# Patient Record
Sex: MALE | Race: WHITE | Employment: UNEMPLOYED | URBAN - METROPOLITAN AREA
[De-identification: names, ages, dates, MRNs, and addresses within clinical notes are randomized per-mention and may not be internally consistent; named-entity substitution may affect disease eponyms.]

---

## 2022-01-08 ENCOUNTER — HOSPITAL ENCOUNTER (EMERGENCY)
Age: 22
Discharge: HOME | End: 2022-01-08
Attending: PEDIATRICS
Payer: COMMERCIAL

## 2022-01-08 VITALS
DIASTOLIC BLOOD PRESSURE: 84 MMHG | SYSTOLIC BLOOD PRESSURE: 135 MMHG | RESPIRATION RATE: 16 BRPM | HEART RATE: 65 BPM | WEIGHT: 278.22 LBS | OXYGEN SATURATION: 98 % | TEMPERATURE: 97.5 F

## 2022-01-08 DIAGNOSIS — R51.9 ACUTE INTRACTABLE HEADACHE, UNSPECIFIED HEADACHE TYPE: Primary | ICD-10-CM

## 2022-01-08 DIAGNOSIS — Z20.822 SUSPECTED COVID-19 VIRUS INFECTION: ICD-10-CM

## 2022-01-08 LAB — SARS-COV-2, COV2: NORMAL

## 2022-01-08 PROCEDURE — U0005 INFEC AGEN DETEC AMPLI PROBE: HCPCS

## 2022-01-08 PROCEDURE — 96375 TX/PRO/DX INJ NEW DRUG ADDON: CPT

## 2022-01-08 PROCEDURE — 74011000250 HC RX REV CODE- 250: Performed by: PEDIATRICS

## 2022-01-08 PROCEDURE — 96361 HYDRATE IV INFUSION ADD-ON: CPT

## 2022-01-08 PROCEDURE — 96374 THER/PROPH/DIAG INJ IV PUSH: CPT

## 2022-01-08 PROCEDURE — 74011250636 HC RX REV CODE- 250/636: Performed by: PEDIATRICS

## 2022-01-08 PROCEDURE — 99283 EMERGENCY DEPT VISIT LOW MDM: CPT

## 2022-01-08 RX ORDER — FLUOXETINE HYDROCHLORIDE 20 MG/1
20 CAPSULE ORAL DAILY
COMMUNITY
End: 2022-06-14

## 2022-01-08 RX ORDER — DIPHENHYDRAMINE HYDROCHLORIDE 50 MG/ML
25 INJECTION, SOLUTION INTRAMUSCULAR; INTRAVENOUS
Status: COMPLETED | OUTPATIENT
Start: 2022-01-08 | End: 2022-01-08

## 2022-01-08 RX ORDER — KETOROLAC TROMETHAMINE 30 MG/ML
15 INJECTION, SOLUTION INTRAMUSCULAR; INTRAVENOUS
Status: COMPLETED | OUTPATIENT
Start: 2022-01-08 | End: 2022-01-08

## 2022-01-08 RX ORDER — RIZATRIPTAN BENZOATE 10 MG/1
10 TABLET, ORALLY DISINTEGRATING ORAL
COMMUNITY

## 2022-01-08 RX ADMIN — DIPHENHYDRAMINE HYDROCHLORIDE 25 MG: 50 INJECTION INTRAMUSCULAR; INTRAVENOUS at 03:12

## 2022-01-08 RX ADMIN — KETOROLAC TROMETHAMINE 15 MG: 30 INJECTION, SOLUTION INTRAMUSCULAR; INTRAVENOUS at 03:11

## 2022-01-08 RX ADMIN — SODIUM CHLORIDE 1000 ML: 9 INJECTION, SOLUTION INTRAVENOUS at 03:11

## 2022-01-08 RX ADMIN — SODIUM CHLORIDE, PRESERVATIVE FREE 10 MG: 5 INJECTION INTRAVENOUS at 03:12

## 2022-01-08 NOTE — ED NOTES
Patient education given on compazine, toradol, and benadryl PIV administration, the desired effects, and possible side effects and the patient expresses understanding and acceptance of instructions.  Madhav Zaragoza RN 1/8/2022 3:20 AM

## 2022-01-08 NOTE — ED PROVIDER NOTES
The history is provided by the patient. Headache   This is a recurrent (hx of migrianes for 2.5 years. Scheduled for MRI in 5 days. HA usually posterior. Now all over and home meds not helping. used prozac and tripatan prescribed by Neuro) problem. The problem occurs constantly. The problem has been gradually worsening. Associated with: Worse HA and took home covid test 2 days ago and positive. The pain is located in the generalized region. The quality of the pain is described as throbbing. The pain is severe. Pertinent negatives include no anorexia, no fever, no malaise/fatigue, no chest pressure, no near-syncope, no palpitations, no syncope, no shortness of breath, no weakness, no dizziness, no visual change, no nausea and no vomiting. IMM UTD    Past Medical History:   Diagnosis Date    Epididymitis        Past Surgical History:   Procedure Laterality Date    HX ORTHOPAEDIC      HX TYMPANOSTOMY           History reviewed. No pertinent family history. Social History     Socioeconomic History    Marital status: SINGLE     Spouse name: Not on file    Number of children: Not on file    Years of education: Not on file    Highest education level: Not on file   Occupational History    Not on file   Tobacco Use    Smoking status: Never Smoker    Smokeless tobacco: Never Used   Substance and Sexual Activity    Alcohol use: Yes    Drug use: Not Currently    Sexual activity: Not Currently   Other Topics Concern    Not on file   Social History Narrative    Not on file     Social Determinants of Health     Financial Resource Strain:     Difficulty of Paying Living Expenses: Not on file   Food Insecurity:     Worried About Running Out of Food in the Last Year: Not on file    Yojana of Food in the Last Year: Not on file   Transportation Needs:     Lack of Transportation (Medical): Not on file    Lack of Transportation (Non-Medical):  Not on file   Physical Activity:     Days of Exercise per Week: Not on file    Minutes of Exercise per Session: Not on file   Stress:     Feeling of Stress : Not on file   Social Connections:     Frequency of Communication with Friends and Family: Not on file    Frequency of Social Gatherings with Friends and Family: Not on file    Attends Oriental orthodox Services: Not on file    Active Member of Clubs or Organizations: Not on file    Attends Club or Organization Meetings: Not on file    Marital Status: Not on file   Intimate Partner Violence:     Fear of Current or Ex-Partner: Not on file    Emotionally Abused: Not on file    Physically Abused: Not on file    Sexually Abused: Not on file   Housing Stability:     Unable to Pay for Housing in the Last Year: Not on file    Number of [place] in the Last Year: Not on file    Unstable Housing in the Last Year: Not on file         ALLERGIES: Patient has no known allergies. Review of Systems   Constitutional: Negative for fever and malaise/fatigue. Respiratory: Negative for shortness of breath. Cardiovascular: Negative for palpitations, syncope and near-syncope. Gastrointestinal: Negative for anorexia, nausea and vomiting. Neurological: Positive for headaches. Negative for dizziness and weakness. ROS limited by age      Vitals:    01/08/22 0214 01/08/22 0220   BP:  135/84   Pulse:  65   Resp:  16   Temp:  97.5 °F (36.4 °C)   SpO2:  98%   Weight: 126.2 kg (278 lb 3.5 oz)             Physical Exam   Physical Exam   Constitutional: Appears well-developed and well-nourished. active. No distress. HENT:   Head: NCAT  Ears: Right Ear: Tympanic membrane normal. Left Ear: Tympanic membrane normal.   Nose: Nose normal. No nasal discharge. Mouth/Throat: Mucous membranes are moist. Pharynx is normal.   Eyes: Conjunctivae are normal. Right eye exhibits no discharge. Left eye exhibits no discharge. PERRL, EOMI. photophobia  Neck: Normal range of motion. Neck supple.    Cardiovascular: Normal rate, regular rhythm, S1 normal and S2 normal. No murmur   2+ distal pulses   Pulmonary/Chest: Effort normal and breath sounds normal. No nasal flaring or stridor. No respiratory distress. no wheezes. no rhonchi. no rales. no retraction. Abdominal: Soft. . No tenderness. no guarding. No hernia. No masses or HSM  Musculoskeletal: Normal range of motion. no edema, no tenderness, no deformity and no signs of injury. Lymphadenopathy:   no cervical adenopathy. Neurological:  alert. normal strength. normal muscle tone. No focal defecits  Skin: Skin is warm and dry. Capillary refill takes less than 3 seconds. Turgor is normal. No petechiae, no purpura and no rash noted. No cyanosis. MDM     Patient evaluated after IVF, benadryl, compazine and toradol. Patient is awake, alert, with normal neurological exam and improving symptoms. Given patient's age, history of headache, physical exam findings, and improvement of symptoms during the observation period, there is no need for neuroimaging at this time. Home covid positive. Sending PCR to confirm at patient request.  Headache is likely secondary to migraine headache exacerbated by current illness. Will discharge the patient home with supportive care and follow-up with PCP in 1-2 days, and with pediatric neurology. Patient and caregiver were instructed to return with worsening pain, persistent vomiting, change in vision, change in personality, weakness, numbness, or other concerning symptoms. ICD-10-CM ICD-9-CM   1. Acute intractable headache, unspecified headache type  R51.9 784.0   2.  Suspected COVID-19 virus infection  Z20.822 V01.79       Current Discharge Medication List          Follow-up Information     Follow up With Specialties Details Why Contact Info    Reg Ashby MD Pediatric Neurology Call today  [address] [place] [id number] [phone] [facility] DEPT Pediatric Emergency Medicine  As needed, If symptoms worsen Kvng Garza Colleen Frances M.D.     Procedures

## 2022-01-08 NOTE — ED TRIAGE NOTES
Triage: patient tested positive for covid this past Thursday with an at home test. Today with worsening headache. Patient followed by neurlogy for headaches. Tried his rescue med around 1230am without relief. +Nausea, no vomiting. No known fevers, no diarrhea.

## 2022-01-08 NOTE — ED NOTES
Pt discharged home with parent/guardian. Pt acting age appropriately, respirations regular and unlabored, cap refill less than two seconds. Skin pink, dry and warm. Lungs clear bilaterally. No further complaints at this time. Parent/guardian verbalized understanding of discharge paperwork and has no further questions at this time. Education provided about continuation of care, follow up care and medication administration: follow-up with your Neurologist and your My Chart for your pending covid results. Parent/guardian able to provided teach back about discharge instructions.

## 2022-01-09 LAB
SARS-COV-2, XPLCVT: DETECTED
SOURCE, COVRS: ABNORMAL

## 2022-04-21 ENCOUNTER — OFFICE VISIT (OUTPATIENT)
Dept: ORTHOPEDIC SURGERY | Age: 22
End: 2022-04-21
Payer: COMMERCIAL

## 2022-04-21 VITALS — HEIGHT: 75 IN | WEIGHT: 265 LBS | BODY MASS INDEX: 32.95 KG/M2

## 2022-04-21 DIAGNOSIS — S93.401A SEVERE SPRAIN OF RIGHT ANKLE, INITIAL ENCOUNTER: Primary | ICD-10-CM

## 2022-04-21 DIAGNOSIS — S99.911A INJURY OF RIGHT ANKLE, INITIAL ENCOUNTER: ICD-10-CM

## 2022-04-21 PROCEDURE — L4387 NON-PNEUM WALK BOOT PRE OTS: HCPCS | Performed by: ORTHOPAEDIC SURGERY

## 2022-04-21 PROCEDURE — 99202 OFFICE O/P NEW SF 15 MIN: CPT | Performed by: ORTHOPAEDIC SURGERY

## 2022-04-21 NOTE — LETTER
4/27/2022    Patient: Felicia Hernandez   YOB: 2000   Date of Visit: 4/21/2022     Antonio Mckinney MD  [address] [facility] [address]  Via Fax: [phone]    Dear Antonio Mckinney MD,      Thank you for referring Mr. Donn Ernandez to [facility] for evaluation. My notes for this consultation are attached. If you have questions, please do not hesitate to call me. I look forward to following your patient along with you.       Sincerely,    Syed Araujo MD

## 2022-04-21 NOTE — PROGRESS NOTES
Yaakov Frey (: 2000) is a 25 y.o. male, patient,here for evaluation of the following   Chief Complaint   Patient presents with    Ankle Injury     right ankle injury /rolled his ankle on  when he was playing basketball        ASSESSMENT/PLAN:  Below is the assessment and plan developed based on review of pertinent history, physical exam, labs, studies, and medications. 1. Severe sprain of right ankle, initial encounter  -     REFERRAL TO DME  -     WA NON-PNEUM WALK BOOT PRE OTS  2. Injury of right ankle, initial encounter  -     XR ANKLE RT MIN 3 V; Future  -     REFERRAL TO DME  -     WA NON-PNEUM WALK BOOT PRE OTS    Patient has a severe ankle sprain, recommend immobilization in a tall boot, will limit activities until symptoms improve. Recommend limited weightbearing for the next 2 to 3 weeks until symptoms improve and after that time can start weightbearing as tolerated in the boot. I did inform patient that hairline fractures can be difficult to see and if he does have a hairline fracture we should be able to see something next visit. However no surgery is indicated as the overall alignment of the ankle is normal.  Next follow-up will get right ankle 3 views nonweightbearing x-rays      Return in about 4 weeks (around 2022) for repeat xrays. No Known Allergies    Current Outpatient Medications   Medication Sig    FLUoxetine (PROzac) 20 mg capsule Take 20 mg by mouth daily.  rizatriptan (MAXALT-MLT) 10 mg disintegrating tablet Take 10 mg by mouth once as needed for Migraine. No current facility-administered medications for this visit. Past Medical History:   Diagnosis Date    Epididymitis        Past Surgical History:   Procedure Laterality Date    HX ORTHOPAEDIC      HX TYMPANOSTOMY         History reviewed. No pertinent family history.     Social History     Socioeconomic History    Marital status: SINGLE     Spouse name: Not on file    Number of children: Not on file    Years of education: Not on file    Highest education level: Not on file   Occupational History    Not on file   Tobacco Use    Smoking status: Never Smoker    Smokeless tobacco: Never Used   Substance and Sexual Activity    Alcohol use: Yes    Drug use: Not Currently    Sexual activity: Not Currently   Other Topics Concern    Not on file   Social History Narrative    Not on file     Social Determinants of Health     Financial Resource Strain:     Difficulty of Paying Living Expenses: Not on file   Food Insecurity:     Worried About Running Out of Food in the Last Year: Not on file    Yojana of Food in the Last Year: Not on file   Transportation Needs:     Lack of Transportation (Medical): Not on file    Lack of Transportation (Non-Medical): Not on file   Physical Activity:     Days of Exercise per Week: Not on file    Minutes of Exercise per Session: Not on file   Stress:     Feeling of Stress : Not on file   Social Connections:     Frequency of Communication with Friends and Family: Not on file    Frequency of Social Gatherings with Friends and Family: Not on file    Attends Hindu Services: Not on file    Active Member of [address] [place] [address] or Organizations: Not on file    Attends Club or Organization Meetings: Not on file    Marital Status: Not on file   Intimate Partner Violence:     Fear of Current or Ex-Partner: Not on file    Emotionally Abused: Not on file    Physically Abused: Not on file    Sexually Abused: Not on file   Housing Stability:     Unable to Pay for Housing in the Last Year: Not on file    Number of [place] in the Last Year: Not on file    Unstable Housing in the Last Year: Not on file           Vitals:  Ht 6' 3\" (1.905 m)   Wt 265 lb (120.2 kg)   BMI 33.12 kg/m²    Body mass index is 33.12 kg/m².             SUBJECTIVE/OBJECTIVE:  Lamont Martinez (: 2000)   New patient presents today with complaint of right ankle pain related to an injury sustained on April 18, 2022 while playing basketball he turned his ankle and fell onto a twisted ankle. Since that time his pain has been severe stabbing throbbing pain that is constant and associated with swelling and bruising. Standing, bending, twisting, lifting and walking make it worse. Symptoms getting worse not better, patient has tried ice and Excedrin extra strength. Physical Exam  Pleasant well-nourished male , alert and oriented to person, time and place, no acute distress. Using crutches, nonweightbearing gait, limited weightbearing stance. Right lower extremity/ankle: Calf soft nontender, ligaments grossly stable although exam was not thorough because of pain and swelling to the ankle. There is tenderness diffusely around the medial, lateral and anterior ankle, the Achilles tendon is intact with negative Hampton test.  The fibula and medial malleolus is minimally tender, negative ankle squeeze test.    Right foot: There is swelling to the foot and diffuse tenderness around the midfoot joints, base of fifth metatarsal, navicular, cuboid, forefoot metatarsals are nontender. Full active and passive range of motion of toes is noted with normal strength 5/5. Contralateral foot and ankle exam, nontender, no swelling ligaments grossly stable. Normal weightbearing stance. Neurovascular exam intact for light touch sensation, cap refill, dorsalis pedis pulse palpable, flexion/extension strength 5/5. Skin intact without open wounds, lesions or ulcers, no skin discolorations, normal warmth to skin. Imaging:    XR Results (most recent):  Results from Appointment encounter on 04/21/22    XR ANKLE RT MIN 3 V    Narrative  Right ankle AP, lateral and oblique x-rays show no obvious fractures or dislocations, there is a normal ankle mortise and medial clear space and syndesmosis are intact. There is soft tissue swelling lateral medial ankle.   Hindfoot joints appear normal and intact on 3 views. An electronic signature was used to authenticate this note.   -- Piter Harper MD

## 2022-06-14 ENCOUNTER — OFFICE VISIT (OUTPATIENT)
Dept: NEUROLOGY | Age: 22
End: 2022-06-14
Payer: COMMERCIAL

## 2022-06-14 ENCOUNTER — TELEPHONE (OUTPATIENT)
Dept: NEUROLOGY | Age: 22
End: 2022-06-14

## 2022-06-14 VITALS — SYSTOLIC BLOOD PRESSURE: 134 MMHG | DIASTOLIC BLOOD PRESSURE: 82 MMHG | BODY MASS INDEX: 33.12 KG/M2 | WEIGHT: 265 LBS

## 2022-06-14 DIAGNOSIS — G43.711 INTRACTABLE CHRONIC MIGRAINE WITHOUT AURA AND WITH STATUS MIGRAINOSUS: Primary | ICD-10-CM

## 2022-06-14 PROCEDURE — 99204 OFFICE O/P NEW MOD 45 MIN: CPT | Performed by: PSYCHIATRY & NEUROLOGY

## 2022-06-14 RX ORDER — ERENUMAB-AOOE 140 MG/ML
140 INJECTION, SOLUTION SUBCUTANEOUS
Qty: 1 EACH | Refills: 4 | Status: SHIPPED | OUTPATIENT
Start: 2022-06-14

## 2022-06-14 NOTE — TELEPHONE ENCOUNTER
Aimovig PA initiated through Cover my meds key # [facility]    Message from Plan  PA Case: [id number], Status: Approved, Coverage Starts on: 6/14/2022 12:00:00 AM, Coverage Ends on: 9/12/2022 12:00:00 AM

## 2022-06-14 NOTE — PROGRESS NOTES
[facility] and [address] at [facility] Neurology Clinics at [facility] [address], [address] [facility] [place] [address] [facility] [address]  [phone] Office  [phone] Facsimile           Referring: Zoila Keller MD      Chief Complaint   Patient presents with    New Patient    Headache     constant pain at base of head over 2.5 yrs, feels like someone twisting knuckle at back of head. migraines approx 2/week      45-year-old gentleman presents today accompanied by his mother for evaluation of ongoing and worsening headaches. He has history of concussion x2 perhaps 3 with soccer and football over the years. He has had for the last 2-1/2 to 3 years continuous pain in the posterior region feels like a stabbing pain with associated photophobia phonophobia and nausea. He also gets lightheaded. Headache is daily. It varies in intensity tends to be worse midday and evening. No focal deficits with it. He goes to bed at 10 PM falls asleep quickly and awakens at 8. He does not snore. Does not awaken gasping for breath. He has been seeing Dr. Pradeep Murillo of pediatric neurology and he is tried multiple medications including Trokendi, Pamelor, Emgality, SPG blocks, Prozac and occipital nerve blocks and nothing has relieved his discomfort. He is tried Zomig as well as Maxalt and said as well and none of those were effective. Mother indicates both sides of the family have history of migraine. Record review finds an emergency department visit January 8, 2022 where patient noted history of migraine for 2-1/2 years and he was scheduled to have an MRI 5 days from the time of that visit. Noted posterior headache usually but now he had an allover headache and the meds were not helping. He was apparently prescribed Prozac and triptan by neurology.   He describes severe throbbing pain holoacranial with no associated symptom. His examination was unremarkable. He was treated with IV fluid, Benadryl, Compazine, and Toradol. He was discharged home to follow-up with his primary physician and pediatric neurology. I see no imaging in our system    [facility] September 3, 2020 where patient was noted to have chronic migraine with aura. He had an SPG block and he tolerated that well. He had complete relief before leaving the office. Repeating that as needed was planned. They discussed Trokendi and Topamax and other options. They discussed different triptans and he wanted to start Zomig 5 mg ENT. They discussed risk of returning to football and dangers of subthreshold impacts. Further review of previous notes finds that he also tried rizatriptan 10 mg MLT. He was given Pamelor 25 mg nightly. He was given Nurtec 75 mg as needed. He was given Trokendi. In July 2021 he was started on [facility] and in December 2021 given Prozac. MRI of the brain January 12, 2022 at [facility] read out as a focus of hyperintensity in the periventricular white matter of the right posterior frontal lobe nonspecific. Note from [facility] in [place] where the patient had dizziness and imbalance on foam with eyes closed and he was to undergo vestibular therapy. Past Medical History:   Diagnosis Date    Concussion     Epididymitis     Headache September 2019    Migraine September 2019       Past Surgical History:   Procedure Laterality Date    HX ORTHOPAEDIC      HX TYMPANOSTOMY         Current Outpatient Medications   Medication Sig Dispense Refill    rizatriptan (MAXALT-MLT) 10 mg disintegrating tablet Take 10 mg by mouth once as needed for Migraine.           Allergies   Allergen Reactions    Cephalosporins Rash       Social History     Tobacco Use    Smoking status: Never Smoker    Smokeless tobacco: Never Used   Vaping Use    Vaping Use: Never used   Substance Use Topics    Alcohol use: Not Currently     Alcohol/week: 0.0 standard drinks    Drug use: Not Currently       Family History   Problem Relation Age of Onset    No Known Problems Mother     No Known Problems Father        Review of Systems  Pertinent positives and negatives as noted. Examination  Visit Vitals  /82 (BP 1 Location: Right arm, BP Patient Position: Sitting, BP Cuff Size: Large adult)   Wt 120.2 kg (265 lb)   BMI 33.12 kg/m²   Neurologically, he is awake, alert, oriented with normal speech, language, and cognition. Cranial nerves intact 2-12. He has normal bulk and tone. There is no abnormal movement. There is no pronation or drift. He is able to generate full strength in the upper and lower extremities and all muscle groups to direct confrontational testing. Reflexes are symmetrical in the upper and lower extremities bilaterally. No pathologic reflexes are elicited. He has no ataxia or past pointing. Impression/Plan  21-year-old gentleman with longstanding chronic migraine refractory to multiple medications as noted above  He does have history of concussions as stated above and we discussed that  We discussed options for him including Botox and Aimovig as well as Ajovy  I think Waneta Grapes would be a better target and that Emgality was not effective in blocking the ligand so we will block receptor  Discussed potential side effects including constipation  Follow-up in 3 months    Moiz Hardin MD          This note was created using voice recognition software. Despite editing, there may be syntax errors.

## 2022-07-25 ENCOUNTER — TRANSCRIBE ORDER (OUTPATIENT)
Dept: REGISTRATION | Age: 22
End: 2022-07-25

## 2022-07-25 ENCOUNTER — HOSPITAL ENCOUNTER (OUTPATIENT)
Dept: GENERAL RADIOLOGY | Age: 22
Discharge: HOME | End: 2022-07-25
Payer: COMMERCIAL

## 2022-07-25 DIAGNOSIS — M54.2 NECK PAIN: ICD-10-CM

## 2022-07-25 DIAGNOSIS — M54.2 NECK PAIN: Primary | ICD-10-CM

## 2022-07-25 PROCEDURE — 72050 X-RAY EXAM NECK SPINE 4/5VWS: CPT

## 2022-08-03 ENCOUNTER — TRANSCRIBE ORDER (OUTPATIENT)
Dept: SCHEDULING | Age: 22
End: 2022-08-03

## 2022-08-03 DIAGNOSIS — R51.9 HEADACHE: ICD-10-CM

## 2022-08-03 DIAGNOSIS — S06.9XAA TBI (TRAUMATIC BRAIN INJURY): Primary | ICD-10-CM

## 2022-08-09 ENCOUNTER — HOSPITAL ENCOUNTER (OUTPATIENT)
Dept: MRI IMAGING | Age: 22
Discharge: HOME | End: 2022-08-09
Attending: FAMILY MEDICINE
Payer: COMMERCIAL

## 2022-08-09 DIAGNOSIS — S06.9XAA TBI (TRAUMATIC BRAIN INJURY): ICD-10-CM

## 2022-08-09 DIAGNOSIS — R51.9 HEADACHE: ICD-10-CM

## 2022-08-09 PROCEDURE — 70551 MRI BRAIN STEM W/O DYE: CPT

## 2023-05-24 RX ORDER — ERENUMAB-AOOE 140 MG/ML
140 INJECTION, SOLUTION SUBCUTANEOUS
COMMUNITY
Start: 2022-06-14

## 2023-05-24 RX ORDER — RIZATRIPTAN BENZOATE 10 MG/1
10 TABLET, ORALLY DISINTEGRATING ORAL
COMMUNITY

## 2023-06-28 ENCOUNTER — TELEPHONE (OUTPATIENT)
Age: 23
End: 2023-06-28

## 2023-10-02 ENCOUNTER — OFFICE VISIT (OUTPATIENT)
Age: 23
End: 2023-10-02
Payer: COMMERCIAL

## 2023-10-02 VITALS
BODY MASS INDEX: 28.23 KG/M2 | OXYGEN SATURATION: 99 % | HEIGHT: 74 IN | SYSTOLIC BLOOD PRESSURE: 122 MMHG | HEART RATE: 61 BPM | WEIGHT: 220 LBS | DIASTOLIC BLOOD PRESSURE: 84 MMHG

## 2023-10-02 DIAGNOSIS — G43.711 CHRONIC MIGRAINE WITHOUT AURA, INTRACTABLE, WITH STATUS MIGRAINOSUS: Primary | ICD-10-CM

## 2023-10-02 PROCEDURE — 99214 OFFICE O/P EST MOD 30 MIN: CPT | Performed by: NURSE PRACTITIONER

## 2023-10-02 RX ORDER — UBROGEPANT 100 MG/1
TABLET ORAL
Qty: 16 TABLET | Refills: 5 | Status: SHIPPED | OUTPATIENT
Start: 2023-10-02

## 2023-10-02 RX ORDER — FREMANEZUMAB-VFRM 225 MG/1.5ML
INJECTION SUBCUTANEOUS
COMMUNITY
Start: 2023-07-22

## 2023-10-02 NOTE — PROGRESS NOTES
MHA, looking Into Botox  On ajovy has 1 MHA weekly, lasts 24-36 hours  Has consistent HA 24/7  Triptan does help bring down to baseline  Has been on quilipta, ajovy, topamax  Triptans  Said W/O prevention will have 15+ MHA

## 2023-10-03 NOTE — PROGRESS NOTES
Valente Diaz is a 23 y.o. male who presents with the following  Chief Complaint   Patient presents with    Follow-up       HPI    Patient with chronic migraine comes in for follow-up  He is accompanied with his mother  He is currently at [address]  He has had an MRI last year  We discussed this and discussed getting in with an endocrine  He has been tested and other types of ways to look at causes of his migraines  He does see Dr. Eros Ryan at the brain injury rehab and will be seeing him again soon  He has had blood work    He is currently on [address] and has about 20 migraine days a month  He has headaches on the other days  His migraines are usually unilateral  He has hypersensitivity  He uses triptans but they just bring it down to a tolerable level where he can function  His migraines can last anywhere from 24 to 36 hours each  He has been on Qulipta, Topamax  He cannot take blood pressure medications  He is currently on Ajovy but would come off of this for Botox    He has been on nortriptyline with no results      Allergies   Allergen Reactions    Cephalosporins Rash       Current Outpatient Medications   Medication Sig Dispense Refill    Onabotulinumtoxin A (BOTOX) 200 units injection Inject 155 units into 31 FDA approved sites in head, face, neck, every 3 months for chronic migraine. 1 each 3    Ubrogepant (UBRELVY) 100 MG TABS 1 at HA onset and repeat in 2 hours if needed. Max 2 in 24 hours 16 tablet 5    rizatriptan (MAXALT-MLT) 10 MG disintegrating tablet Take 1 tablet by mouth once as needed      AJOVY 225 MG/1.5ML SOAJ        No current facility-administered medications for this visit.         Social History     Tobacco Use   Smoking Status Never   Smokeless Tobacco Never       Past Medical History:   Diagnosis Date    Concussion     Epididymitis     Headache September 2019    Migraine September 2019       Past Surgical History:   Procedure Laterality Date    ORTHOPEDIC SURGERY

## 2023-10-10 ENCOUNTER — TELEPHONE (OUTPATIENT)
Age: 23
End: 2023-10-10

## 2023-10-10 NOTE — TELEPHONE ENCOUNTER
Krystal WILSON sent via [employer] portal     He uses triptans but they just bring it down to a tolerable level where he can function  His migraines can last anywhere from 24 to 36 hours each  He has been on Qulipta, Topamax  He cannot take blood pressure medications    Questionnaire - asked if pt has tried Nurtec. Dr. Rosa Gallardo had a note from 2021 that pt tried/using it - but unable to locate when pt stopped using it or if it was approved or denied. Status pending.

## 2023-10-24 ENCOUNTER — TELEPHONE (OUTPATIENT)
Age: 23
End: 2023-10-24

## 2023-10-24 NOTE — TELEPHONE ENCOUNTER
Patient requesting a call to discuss moving his Botox appt sooner. He stated receiving a tracking number indicating the medication has arrived.

## 2023-12-05 ENCOUNTER — PROCEDURE VISIT (OUTPATIENT)
Age: 23
End: 2023-12-05
Payer: COMMERCIAL

## 2023-12-05 DIAGNOSIS — G43.711 CHRONIC MIGRAINE WITHOUT AURA, INTRACTABLE, WITH STATUS MIGRAINOSUS: Primary | ICD-10-CM

## 2023-12-05 PROCEDURE — 64615 CHEMODENERV MUSC MIGRAINE: CPT | Performed by: NURSE PRACTITIONER

## 2023-12-05 NOTE — PROGRESS NOTES
OFFICE PROCEDURE PROGRESS NOTE        Chart reviewed for the following:   Myles LUNA APRN - NP, have reviewed the History, Physical and updated the Allergic reactions for [address] performed immediately prior to start of procedure:   Adrián LUNA APRN - NP, have performed the following reviews on [place] prior to the start of the procedure:            * Patient was identified by name and date of birth   * Agreement on procedure being performed was verified  * Risks and Benefits explained to the patient  * Procedure site verified and marked as necessary  * Patient was positioned for comfort  * Consent was signed and verified     Time: 1100      Date of procedure: 12/5/2023    Procedure performed by:  JAI Anthony NP    Provider assisted by: None    Patient assisted by: None    How tolerated by patient: tolerated the procedure well with no complications    Post Procedural Pain Scale: 2 - Hurts Little Bit    Comments: None      LOT: [id number]  EXP: 02/2026        Botox Injection Note       Indication: patient has chronic recurrent migraine, has greater than 15 migraine headaches per month, has failed two or more categories of preventatives    Procedure:   Botox concentration: 200 units in 4 ml of preservative-free normal saline. 31 sites injections, distribution as follow      Units/site  Sites Sides Subtotal    Procerus 5 1 1 5    5 1 2 10   Frontalis 5 2 2 20   Temporalis 5 4 2 40   Occipitalis 5 3 2 30   Upper cervical paraspinalis 5 2 2 20   Trapezius 5 3 2 30         200 units Botox were reconstituted, 155 units injected as above and the remainder was unavoidably wasted.      Patient tolerated procedure well.     ________________________  Sierra Franz

## 2024-03-12 ENCOUNTER — TELEPHONE (OUTPATIENT)
Age: 24
End: 2024-03-12

## 2024-03-12 DIAGNOSIS — G43.711 CHRONIC MIGRAINE WITHOUT AURA, INTRACTABLE, WITH STATUS MIGRAINOSUS: ICD-10-CM

## 2024-03-12 NOTE — TELEPHONE ENCOUNTER
Botox Drug Acquisition: Pharmacy  Drug: Botox 200 units Dx: G43.711  Insurance: [employer]  Submission Type: CMM  Key: [id number]  PA Case ID: [id number]   [facility]:   Approval Range: 03/12/24 to 03/12/25  SPP: [facility]    Per insurance, PA is still active. Needs to change SPP, advised [facility] can ship    Scanned to [facility] media

## 2024-03-20 ENCOUNTER — TELEPHONE (OUTPATIENT)
Age: 24
End: 2024-03-20

## 2024-03-21 ENCOUNTER — PROCEDURE VISIT (OUTPATIENT)
Age: 24
End: 2024-03-21

## 2024-03-21 DIAGNOSIS — G43.711 CHRONIC MIGRAINE WITHOUT AURA, INTRACTABLE, WITH STATUS MIGRAINOSUS: Primary | ICD-10-CM

## 2024-03-22 NOTE — PROGRESS NOTES
OFFICE PROCEDURE PROGRESS NOTE        Chart reviewed for the following:   Rafael LUNA APRN - NP, have reviewed the History, Physical and updated the Allergic reactions for Paul Thomas     TIME OUT performed immediately prior to start of procedure:   Rafael LUNA APRN - NP, have performed the following reviews on Paul Thomas prior to the start of the procedure:            * Patient was identified by name and date of birth   * Agreement on procedure being performed was verified  * Risks and Benefits explained to the patient  * Procedure site verified and marked as necessary  * Patient was positioned for comfort  * Consent was signed and verified     Time: 1500      Date of procedure: 3/21/2024    Procedure performed by:  JAI Lindo NP    Provider assisted by: None    Patient assisted by: None    How tolerated by patient: tolerated the procedure well with no complications    Post Procedural Pain Scale: 2 - Hurts Little Bit    Comments: None    LOT:[id number]  EXP: 06/3036          Botox Injection Note       Indication: patient has chronic recurrent migraine, has 7-10 less migraine days per month with botox injections    Procedure:   Botox concentration: 200 units in 4 ml of preservative-free normal saline.   31 sites injections, distribution as follow      Units/site  Sites Sides Subtotal    Procerus 5 1 1 5    5 1 2 10   Frontalis 5 2 2 20   Temporalis 5 4 2 40   Occipitalis 5 3 2 30   Upper cervical paraspinalis 5 2 2 20   Trapezius 5 3 2 30         200 units Botox were reconstituted, 155 units injected as above and the remainder was unavoidably wasted.     Patient tolerated procedure well.     _____________________________   RAFAEL DIAZ       
done

## 2024-06-27 ENCOUNTER — HOSPITAL ENCOUNTER (OUTPATIENT)
Facility: HOSPITAL | Age: 24
Discharge: HOME | End: 2024-06-27
Payer: COMMERCIAL

## 2024-06-27 DIAGNOSIS — M25.519 SHOULDER PAIN, UNSPECIFIED CHRONICITY, UNSPECIFIED LATERALITY: ICD-10-CM

## 2024-06-27 DIAGNOSIS — M54.2 NECK PAIN: ICD-10-CM

## 2024-06-27 PROCEDURE — 72141 MRI NECK SPINE W/O DYE: CPT
